# Patient Record
Sex: FEMALE | Race: BLACK OR AFRICAN AMERICAN | NOT HISPANIC OR LATINO | URBAN - METROPOLITAN AREA
[De-identification: names, ages, dates, MRNs, and addresses within clinical notes are randomized per-mention and may not be internally consistent; named-entity substitution may affect disease eponyms.]

---

## 2024-07-11 ENCOUNTER — OFFICE VISIT (OUTPATIENT)
Dept: URBAN - METROPOLITAN AREA CLINIC 105 | Facility: CLINIC | Age: 29
End: 2024-07-11

## 2024-07-11 RX ORDER — DICYCLOMINE HYDROCHLORIDE 20 MG/1
TAKE 1 TABLET BY MOUTH EVERY 6 HOURS FOR 10 DAYS TABLET ORAL
Qty: 40 EACH | Refills: 0 | Status: ACTIVE | COMMUNITY

## 2024-07-11 RX ORDER — FAMOTIDINE 20 MG/1
PLEASE SEE ATTACHED FOR DETAILED DIRECTIONS TABLET, FILM COATED ORAL
Qty: 28 EACH | Refills: 0 | Status: ACTIVE | COMMUNITY

## 2024-07-11 RX ORDER — LEVONORGESTREL / ETHINYL ESTRADIOL AND ETHINYL ESTRADIOL 150-30(84)
KIT ORAL
Qty: 91 TABLET | Status: ACTIVE | COMMUNITY

## 2024-07-11 RX ORDER — IBUPROFEN 800 MG/1
TAKE 1 TABLET BY MOUTH EVERY 8 HOURS TABLET, FILM COATED ORAL
Qty: 21 EACH | Refills: 0 | Status: ACTIVE | COMMUNITY

## 2024-07-11 RX ORDER — TRAMADOL HYDROCHLORIDE 50 MG/1
TAKE 1 TABLET BY MOUTH EVERY 8 HOURS FOR 3 DAYS TABLET, FILM COATED ORAL
Qty: 10 EACH | Refills: 0 | Status: ACTIVE | COMMUNITY

## 2024-07-11 RX ORDER — ONDANSETRON 4 MG/1
TABLET, ORALLY DISINTEGRATING ORAL
Qty: 15 EACH | Status: ACTIVE | COMMUNITY

## 2024-07-11 NOTE — HPI-ZZZTODAY'S VISIT
29-year-old female with PMH of cannabis hyperemesis syndrome, presenting to discuss vomiting and chest and abdominal pain.  She was last seen by Dr. Aster De Guzman 3/1/2017 for abdominal pain.  She was ordered EGD, ultrasound, and labs at that time; EGD showed mild reflux esophagitis and gastritis, and RUQ US showed stones and sludge in gallbladder.  Patient was seen in ER 6/16/2024 for current symptoms.  She reported regular marijuana use.  She was given fluids, Zofran, pain medications, and Pepcid.  Cardiac evaluation normal.  She was discharged with prescription for Zofran 4 mg. - PPI, stop marijuana, repeat US  Labs 6/16/2024: Glucose 137, calcium 8.4, CMP otherwise normal.  Magnesium 1.7.  Lipase 10.  CBC normal.  Troponin negative. 5/16/2024: Glucose 125, potassium 3.3, ALT 56, CMP otherwise normal.  CBC, lipase, troponin normal.  hCG negative.  EGD 3/2/2017:Z-line variable at 34 cm, erythema in lower third of esophagus, erythema in antrum, bilious gastric fluid, flattened mucosa in duodenum.  Pathology showing esophagus mucosa with reactive changes and mild chronic inflammation, mild chronic gastritis; negative H. pylori, intestinal metaplasia, celiac. Amee

## 2025-03-18 ENCOUNTER — OFFICE VISIT (OUTPATIENT)
Dept: URBAN - METROPOLITAN AREA CLINIC 128 | Facility: CLINIC | Age: 30
End: 2025-03-18